# Patient Record
Sex: FEMALE | Race: WHITE | NOT HISPANIC OR LATINO | Employment: OTHER | URBAN - METROPOLITAN AREA
[De-identification: names, ages, dates, MRNs, and addresses within clinical notes are randomized per-mention and may not be internally consistent; named-entity substitution may affect disease eponyms.]

---

## 2017-06-13 ENCOUNTER — OFFICE VISIT (OUTPATIENT)
Dept: PSYCHIATRY | Facility: CLINIC | Age: 62
End: 2017-06-13
Payer: MEDICARE

## 2017-06-13 DIAGNOSIS — Z01.818 PREOP EXAMINATION: Primary | ICD-10-CM

## 2017-06-13 DIAGNOSIS — E66.01 MORBID OBESITY, UNSPECIFIED OBESITY TYPE: ICD-10-CM

## 2017-06-13 PROBLEM — Z98.890 HISTORY OF SURGICAL PROCEDURE: Status: ACTIVE | Noted: 2017-04-13

## 2017-06-13 PROCEDURE — 90792 PSYCH DIAG EVAL W/MED SRVCS: CPT | Mod: S$GLB,,, | Performed by: PSYCHIATRY & NEUROLOGY

## 2017-06-13 NOTE — PROGRESS NOTES
Outpatient Psychiatry Initial Visit (MD/NP)    2017    Laureen Cervantes, a 62 y.o. female, presenting for initial evaluation visit. Met with patient.    Reason for Encounter: . Patient complains of desire for psychiatric evaluation for purposes of bariatric surgery    History of Present Illness:  Ms. Cervantes is a 63 y/o WF with no previous history of psychiatric evaluation or treatment who presents at request of a Dr. Kaur at [facility], [place], operated by [facility] physician's group, who Ms. Cervantes has consulted with regard to bariatric treatment. She is currently being considered for placement of gastric sleeve for which she requires psychiatric clearance. Ms. Cervantes reports moods are generally euthymic, has never had periods of prolonged dysphoria, compulsive behaviors including binging, purging, expectorating, or other disordered eating behaviors, process addictions, substance abuse or other substance abuse. She denies ever having family, friends, teachers, coworkers, or neighbors expressing concerns about her moods or behaviors or having been encouraged by anyone to seek mental health care. She has never participated in outpatient or inpatient treatment, considered suicide, experienced hallucinations, experienced clinical levels of depression, anxiety. Self-describes her personality as easy-going, reliable, conscientious. She attributes her obesity eating habits, sedentary lifestyle, family predisposition, and in recent years, difficulty affording healthy foods due to fixed income.     Psych Hx: as above no hx of psychiatric treatment.   FamHx: no mental health history.   Social Hx: grew up with both parents; normal development. Middle child of 5. Denies any serious mistreatment. Average grades; Grad HS; 4 children, good relationships with all.  following marriage x 33 years.   9.5 years ago. 12 grandkids. Oldest son lives with her. 2 has kids. Previous employed for [employer] 11 years. Full-time work when  's disabled. Retired 9 years ago.     MedicalHx: obesity, arthritis in just about every joint. Back. Told not to go. HTN, pre-diabetes. HLD  Allergies: nkda  Meds:   Omeprazole  Bisoprolol  Lisinopril  Calcium  Tylenol,   Fish oil  Atorvastatin    Substance Hx: denies illicits, prescription misuse, alcohol abuse    Review Of Systems:     GENERAL:  No weight gain or loss  SKIN:  No rashes or lacerations  HEAD:  No headaches  EYES:  No exophthalmos, jaundice or blindness  EARS:  No dizziness, tinnitus or hearing loss  NOSE:  No changes in smell  MOUTH & THROAT:  No dyskinetic movements or obvious goiter  CHEST:  No shortness of breath, hyperventilation or cough  CARDIOVASCULAR:  No tachycardia or chest pain  ABDOMEN:  No nausea, vomiting, pain, constipation or diarrhea  URINARY:  No frequency, dysuria or sexual dysfunction  ENDOCRINE:  No polydipsia, polyuria  MUSCULOSKELETAL:  No pain or stiffness of the joints  NEUROLOGIC:  No weakness, sensory changes, seizures, confusion, memory loss, tremor or other abnormal movements    Current Evaluation:     Nutritional Screening: Considering the patient's height and weight, medications, medical history and preferences, should a referral be made to the dietitian? no    Constitutional  Vitals:  Most recent vital signs, dated less than 90 days prior to this appointment, were not reviewed.    There were no vitals filed for this visit.     General:  unremarkable, age appropriate     Musculoskeletal  Muscle Strength/Tone:  no tremor, no tic   Gait & Station:  non-ataxic     Psychiatric  Appearance: casually dressed & groomed;   Behavior: calm,   Cooperation: cooperative with assessment  Speech: normal rate, volume, tone  Thought Process: linear, goal-directed  Thought Content: No suicidal or homicidal ideation; no delusions  Affect: normal range  Mood: euthymic  Perceptions: No auditory or visual hallucinations  Level of Consciousness: alert throughout interview  Insight:  fair  Cognition: Oriented to person, place, time, & situation  Memory: no apparent deficits to general clinical interview; not formally assessed  Attention/Concentration: no apparent deficits to general clinical interview; not formally assessed  Fund of Knowledge: average by vocabulary/education    Laboratory Data  No visits with results within 1 Month(s) from this visit.   Latest known visit with results is:   No results found for any previous visit.     Medications  No outpatient encounter prescriptions on file as of 6/13/2017.     No facility-administered encounter medications on file as of 6/13/2017.      Assessment - Diagnosis - Goals:     Impression: This 63 y/o F with no previous psychiatric history presents no current psychiatric symptoms or concerning history that would contraindicate or complicate bariatric surgery. Fixed income is more likely than psychological factors to be a barrier to dietary changes likely to be recommended.     Diagnoses: pre-operative examination, morbid obesity    Treatment Goals:  Specify outcomes written in observable, behavioral terms:   None at this time    Treatment Plan/Recommendations:   · Follow-up as needed    Return to Clinic: 6 weeks    Counseling time: 10 minutes  Total time: 50 minutes    CARLA Mcallister MD

## 2017-06-21 RX ORDER — AMOXICILLIN 500 MG
1 CAPSULE ORAL
COMMUNITY

## 2017-06-21 RX ORDER — OMEPRAZOLE 20 MG/1
20 CAPSULE, DELAYED RELEASE ORAL
COMMUNITY
Start: 2017-03-22

## 2017-06-21 RX ORDER — ATORVASTATIN CALCIUM 40 MG/1
40 TABLET, FILM COATED ORAL
COMMUNITY
Start: 2017-03-22

## 2017-06-21 RX ORDER — BISOPROLOL FUMARATE AND HYDROCHLOROTHIAZIDE 5; 6.25 MG/1; MG/1
1 TABLET ORAL
COMMUNITY
Start: 2017-03-22

## 2017-06-21 RX ORDER — MULTIVITAMIN
1 TABLET ORAL
COMMUNITY

## 2017-06-21 RX ORDER — LISINOPRIL 40 MG/1
40 TABLET ORAL
COMMUNITY
Start: 2017-03-22